# Patient Record
Sex: MALE | Race: WHITE | NOT HISPANIC OR LATINO | Employment: STUDENT | URBAN - METROPOLITAN AREA
[De-identification: names, ages, dates, MRNs, and addresses within clinical notes are randomized per-mention and may not be internally consistent; named-entity substitution may affect disease eponyms.]

---

## 2021-09-21 ENCOUNTER — APPOINTMENT (OUTPATIENT)
Dept: CT IMAGING | Facility: CLINIC | Age: 26
End: 2021-09-21
Attending: EMERGENCY MEDICINE
Payer: COMMERCIAL

## 2021-09-21 ENCOUNTER — APPOINTMENT (OUTPATIENT)
Dept: CARDIOLOGY | Facility: CLINIC | Age: 26
End: 2021-09-21
Attending: EMERGENCY MEDICINE
Payer: COMMERCIAL

## 2021-09-21 ENCOUNTER — HOSPITAL ENCOUNTER (EMERGENCY)
Facility: CLINIC | Age: 26
Discharge: HOME | End: 2021-09-21
Attending: EMERGENCY MEDICINE | Admitting: EMERGENCY MEDICINE
Payer: COMMERCIAL

## 2021-09-21 VITALS
HEIGHT: 71 IN | TEMPERATURE: 99.6 F | HEART RATE: 87 BPM | RESPIRATION RATE: 10 BRPM | OXYGEN SATURATION: 99 % | SYSTOLIC BLOOD PRESSURE: 129 MMHG | WEIGHT: 190 LBS | BODY MASS INDEX: 26.6 KG/M2 | DIASTOLIC BLOOD PRESSURE: 81 MMHG

## 2021-09-21 DIAGNOSIS — R50.9 FEVER AND CHILLS: ICD-10-CM

## 2021-09-21 DIAGNOSIS — J18.9 PNEUMONIA OF BOTH LUNGS DUE TO INFECTIOUS ORGANISM, UNSPECIFIED PART OF LUNG: ICD-10-CM

## 2021-09-21 LAB
ANION GAP SERPL CALCULATED.3IONS-SCNC: 6 MMOL/L (ref 3–14)
BASOPHILS # BLD MANUAL: 0 10E3/UL (ref 0–0.2)
BASOPHILS NFR BLD MANUAL: 0 %
BUN SERPL-MCNC: 6 MG/DL (ref 7–30)
CALCIUM SERPL-MCNC: 9 MG/DL (ref 8.5–10.1)
CHLORIDE BLD-SCNC: 105 MMOL/L (ref 94–109)
CO2 SERPL-SCNC: 24 MMOL/L (ref 20–32)
CREAT SERPL-MCNC: 1.08 MG/DL (ref 0.66–1.25)
EOSINOPHIL # BLD MANUAL: 0.1 10E3/UL (ref 0–0.7)
EOSINOPHIL NFR BLD MANUAL: 1 %
ERYTHROCYTE [DISTWIDTH] IN BLOOD BY AUTOMATED COUNT: 11.5 % (ref 10–15)
GFR SERPL CREATININE-BSD FRML MDRD: >90 ML/MIN/1.73M2
GLUCOSE BLD-MCNC: 86 MG/DL (ref 70–99)
HCT VFR BLD AUTO: 44.1 % (ref 40–53)
HGB BLD-MCNC: 15.6 G/DL (ref 13.3–17.7)
LVEF ECHO: NORMAL
LYMPHOCYTES # BLD MANUAL: 1.1 10E3/UL (ref 0.8–5.3)
LYMPHOCYTES NFR BLD MANUAL: 21 %
MCH RBC QN AUTO: 33.6 PG (ref 26.5–33)
MCHC RBC AUTO-ENTMCNC: 35.4 G/DL (ref 31.5–36.5)
MCV RBC AUTO: 95 FL (ref 78–100)
MONOCYTES # BLD MANUAL: 0.3 10E3/UL (ref 0–1.3)
MONOCYTES NFR BLD MANUAL: 6 %
NEUTROPHILS # BLD MANUAL: 3.7 10E3/UL (ref 1.6–8.3)
NEUTROPHILS NFR BLD MANUAL: 72 %
PLAT MORPH BLD: NORMAL
PLATELET # BLD AUTO: 138 10E3/UL (ref 150–450)
POTASSIUM BLD-SCNC: 4.3 MMOL/L (ref 3.4–5.3)
RBC # BLD AUTO: 4.64 10E6/UL (ref 4.4–5.9)
RBC MORPH BLD: NORMAL
SODIUM SERPL-SCNC: 135 MMOL/L (ref 133–144)
WBC # BLD AUTO: 5.2 10E3/UL (ref 4–11)

## 2021-09-21 PROCEDURE — 99285 EMERGENCY DEPT VISIT HI MDM: CPT | Mod: 25 | Performed by: EMERGENCY MEDICINE

## 2021-09-21 PROCEDURE — 258N000003 HC RX IP 258 OP 636: Performed by: EMERGENCY MEDICINE

## 2021-09-21 PROCEDURE — 87040 BLOOD CULTURE FOR BACTERIA: CPT | Performed by: EMERGENCY MEDICINE

## 2021-09-21 PROCEDURE — 96361 HYDRATE IV INFUSION ADD-ON: CPT | Performed by: EMERGENCY MEDICINE

## 2021-09-21 PROCEDURE — 96366 THER/PROPH/DIAG IV INF ADDON: CPT | Performed by: EMERGENCY MEDICINE

## 2021-09-21 PROCEDURE — 85027 COMPLETE CBC AUTOMATED: CPT | Performed by: EMERGENCY MEDICINE

## 2021-09-21 PROCEDURE — 87506 IADNA-DNA/RNA PROBE TQ 6-11: CPT | Performed by: EMERGENCY MEDICINE

## 2021-09-21 PROCEDURE — 93306 TTE W/DOPPLER COMPLETE: CPT

## 2021-09-21 PROCEDURE — 71275 CT ANGIOGRAPHY CHEST: CPT

## 2021-09-21 PROCEDURE — 99284 EMERGENCY DEPT VISIT MOD MDM: CPT | Performed by: EMERGENCY MEDICINE

## 2021-09-21 PROCEDURE — 36415 COLL VENOUS BLD VENIPUNCTURE: CPT | Performed by: EMERGENCY MEDICINE

## 2021-09-21 PROCEDURE — 80048 BASIC METABOLIC PNL TOTAL CA: CPT | Performed by: EMERGENCY MEDICINE

## 2021-09-21 PROCEDURE — 96365 THER/PROPH/DIAG IV INF INIT: CPT | Mod: 59 | Performed by: EMERGENCY MEDICINE

## 2021-09-21 PROCEDURE — 250N000011 HC RX IP 250 OP 636: Performed by: STUDENT IN AN ORGANIZED HEALTH CARE EDUCATION/TRAINING PROGRAM

## 2021-09-21 PROCEDURE — 250N000011 HC RX IP 250 OP 636: Performed by: EMERGENCY MEDICINE

## 2021-09-21 PROCEDURE — 93306 TTE W/DOPPLER COMPLETE: CPT | Mod: 26 | Performed by: STUDENT IN AN ORGANIZED HEALTH CARE EDUCATION/TRAINING PROGRAM

## 2021-09-21 PROCEDURE — 71275 CT ANGIOGRAPHY CHEST: CPT | Mod: 26 | Performed by: RADIOLOGY

## 2021-09-21 RX ORDER — IOPAMIDOL 755 MG/ML
65 INJECTION, SOLUTION INTRAVASCULAR ONCE
Status: COMPLETED | OUTPATIENT
Start: 2021-09-21 | End: 2021-09-21

## 2021-09-21 RX ORDER — SODIUM CHLORIDE 9 MG/ML
INJECTION, SOLUTION INTRAVENOUS CONTINUOUS
Status: DISCONTINUED | OUTPATIENT
Start: 2021-09-21 | End: 2021-09-21 | Stop reason: HOSPADM

## 2021-09-21 RX ORDER — LEVOFLOXACIN 5 MG/ML
750 INJECTION, SOLUTION INTRAVENOUS ONCE
Status: COMPLETED | OUTPATIENT
Start: 2021-09-21 | End: 2021-09-21

## 2021-09-21 RX ORDER — LEVOFLOXACIN 500 MG/1
500 TABLET, FILM COATED ORAL DAILY
Qty: 7 TABLET | Refills: 0 | Status: SHIPPED | OUTPATIENT
Start: 2021-09-21 | End: 2021-09-28

## 2021-09-21 RX ADMIN — SODIUM CHLORIDE 1000 ML: 9 INJECTION, SOLUTION INTRAVENOUS at 14:01

## 2021-09-21 RX ADMIN — LEVOFLOXACIN 750 MG: 5 INJECTION, SOLUTION INTRAVENOUS at 16:04

## 2021-09-21 RX ADMIN — IOPAMIDOL 65 ML: 755 INJECTION, SOLUTION INTRAVENOUS at 14:59

## 2021-09-21 ASSESSMENT — ENCOUNTER SYMPTOMS
BACK PAIN: 1
SORE THROAT: 0
COUGH: 0
DYSURIA: 0
FATIGUE: 0
COLOR CHANGE: 0
FEVER: 1
HEADACHES: 0
MYALGIAS: 0
DIARRHEA: 1
SHORTNESS OF BREATH: 0
HEMATURIA: 0
NAUSEA: 1
ABDOMINAL PAIN: 0
VOMITING: 0

## 2021-09-21 ASSESSMENT — MIFFLIN-ST. JEOR: SCORE: 1863.96

## 2021-09-21 NOTE — ED NOTES
Bed: ED10  Expected date:   Expected time:   Means of arrival:   Comments:  Healthy 24 yo male; persistent fever and chest pain; High d-dimer; Needs CT chest; 102F x4 days; negative Covid/negative flu.  Duane Myklejord; [birth year]     Annamaria Castaneda MD  09/21/21 9441

## 2021-09-21 NOTE — ED PROVIDER NOTES
"    [place] EMERGENCY DEPARTMENT ([facility])  9/21/21  History     Chief Complaint   Patient presents with     Chest Pain     Fever     103.7F @ home.     The history is provided by the patient and medical records.     Duane K Myklejord is an otherwise healthy 25 year old male who presents to the Emergency Department for evaluation of fever. Patient comes in via recommendation after being evaluated at [facility] earlier today.  Patient had an elevated D-dimer at this visit and was encouraged to come into the ED for evaluation.  Upon ED evaluation, patient reports she has had a persistently high fever since Saturday (9/18) with a T-max of 103.7 at home.  Patient also reports he has been having some slight chest pain, and back pain over the right shoulder which is intermittent in nature.  Patient denies any shortness of breath.  Patient also reports he has been having ongoing loose stools for the past 3-4 days which are \"liquid\" in description.  He denies any abdominal pain.  He reports he is slightly nauseous but has not had any vomiting.  Patient reports his appetite has been decreased but has been drinking plenty of fluids.  Patient reports he has been taking Tylenol and ibuprofen at home for his fever, and last took this at 11 PM last night.  Patient denies any fatigue and reports he generally feels well here in the ED.  He denies any myalgias, headache, cough, or sneezing.  No apparent skin rashes or redness.  He denies any new medical problems.  Patient denies any sore throat.  He also denies any dysuria or hematuria.  Patient reports he is currently vaccinated from COVID-19.  He denies any recent antibiotic use.  Of note, patient reports he does live in a house with other individuals who are currently not sick or have any similar symptoms.  Patient reports he did just come back from visiting his parents in [facility], otherwise no recent extended travel.    Past Medical History  No " "past medical history on file.  No past surgical history on file.  levofloxacin (LEVAQUIN) 500 MG tablet      No Known Allergies  Family History  No family history on file.  Social History   Social History     Tobacco Use     Smoking status: Not on file   Substance Use Topics     Alcohol use: Not on file     Drug use: Not on file      Past medical history, past surgical history, medications, allergies, family history, and social history were reviewed with the patient. No additional pertinent items.     I have reviewed the Medications, Allergies, Past Medical and Surgical History, and Social History in the Epic system.    Review of Systems   Constitutional: Positive for fever (TMax 103.7 via home thermometer). Negative for fatigue.   HENT: Negative for sneezing and sore throat.    Respiratory: Negative for cough and shortness of breath.    Cardiovascular: Positive for chest pain.   Gastrointestinal: Positive for diarrhea and nausea. Negative for abdominal pain and vomiting.   Genitourinary: Negative for dysuria and hematuria.   Musculoskeletal: Positive for back pain (Near L-shoulder blade). Negative for myalgias.   Skin: Negative for color change and rash.   Neurological: Negative for headaches.   All other systems reviewed and are negative.        Physical Exam   BP: 125/75  Pulse: 93  Temp: 99.9  F (37.7  C)  Resp: 16  Height: 180.3 cm (5' 11\")  Weight: 86.2 kg (190 lb)  SpO2: 99 %      Physical Exam  Vitals and nursing note reviewed.   Constitutional:       General: He is not in acute distress.     Appearance: He is not ill-appearing, toxic-appearing or diaphoretic.   HENT:      Head: Atraumatic.   Eyes:      General: No scleral icterus.     Pupils: Pupils are equal, round, and reactive to light.   Cardiovascular:      Rate and Rhythm: Normal rate and regular rhythm.      Pulses: Normal pulses.      Heart sounds: Normal heart sounds.   Pulmonary:      Effort: No respiratory distress.      Breath sounds: Normal " breath sounds. No stridor. No wheezing, rhonchi or rales.   Abdominal:      General: Bowel sounds are normal. There is no distension.      Palpations: Abdomen is soft. There is no mass.      Tenderness: There is no abdominal tenderness.      Hernia: No hernia is present.   Musculoskeletal:         General: No swelling or tenderness.      Cervical back: Normal range of motion. No rigidity or tenderness.      Right lower leg: No edema.      Left lower leg: No edema.   Skin:     General: Skin is warm.      Findings: No rash.   Neurological:      General: No focal deficit present.      Mental Status: He is oriented to person, place, and time.      Cranial Nerves: No cranial nerve deficit.      Sensory: No sensory deficit.   Psychiatric:         Mood and Affect: Mood normal.         Behavior: Behavior normal.         Thought Content: Thought content normal.         Judgment: Judgment normal.         ED Course     At 1:39 PM the patient was seen and examined by Annamaria Castaneda MD in Room ED10.        Procedures            Results for orders placed or performed during the hospital encounter of 09/21/21   CT Chest Pulmonary Embolism w Contrast     Status: None    Narrative    CT CHEST PULMONARY EMBOLISM WITH CONTRAST 9/21/2021 3:11 PM    CLINICAL HISTORY: Chest Pain. Pulmonary embolism suspected,  low/intermediate probability, negative D-dimer.  TECHNIQUE: CT angiogram chest during arterial phase injection IV  contrast. 2D and 3D MIP reconstructions were performed by the CT  technologist. Dose reduction techniques were used.     CONTRAST: Iopamidol (ISOVUE-370) solution 65 mL    COMPARISON: None.    FINDINGS:  ANGIOGRAM CHEST: Pulmonary arteries are normal caliber and negative  for pulmonary emboli. Thoracic aorta is negative for dissection. No CT  evidence of right heart strain.    LUNGS AND PLEURA: Patchy bilateral groundglass and consolidative  infiltrates. No effusions.    MEDIASTINUM/AXILLAE: Mildly prominent  lymph nodes noted in the chest,  nonspecific and likely reactive in this setting.    UPPER ABDOMEN: No acute findings in the visualized upper abdomen.    MUSCULOSKELETAL: No frankly destructive bony lesions.      Impression    IMPRESSION:  1.  Scattered groundglass and consolidative infiltrates, question  septic emboli vs. an unusual pattern of pneumonia.  2.  Mildly prominent lymph nodes in the chest which is nonspecific but  likely reactive in this setting.  3.  No pulmonary embolism demonstrated.    MELVIN THURMAN MD         SYSTEM ID:  [id number]   Basic metabolic panel     Status: Abnormal   Result Value Ref Range    Sodium 135 133 - 144 mmol/L    Potassium 4.3 3.4 - 5.3 mmol/L    Chloride 105 94 - 109 mmol/L    Carbon Dioxide (CO2) 24 20 - 32 mmol/L    Anion Gap 6 3 - 14 mmol/L    Urea Nitrogen 6 (L) 7 - 30 mg/dL    Creatinine 1.08 0.66 - 1.25 mg/dL    Calcium 9.0 8.5 - 10.1 mg/dL    Glucose 86 70 - 99 mg/dL    GFR Estimate >90 >60 mL/min/1.73m2   CBC with platelets and differential     Status: Abnormal   Result Value Ref Range    WBC Count 5.2 4.0 - 11.0 10e3/uL    RBC Count 4.64 4.40 - 5.90 10e6/uL    Hemoglobin 15.6 13.3 - 17.7 g/dL    Hematocrit 44.1 40.0 - 53.0 %    MCV 95 78 - 100 fL    MCH 33.6 (H) 26.5 - 33.0 pg    MCHC 35.4 31.5 - 36.5 g/dL    RDW 11.5 10.0 - 15.0 %    Platelet Count 138 (L) 150 - 450 10e3/uL   Manual Differential     Status: None   Result Value Ref Range    % Neutrophils 72 %    % Lymphocytes 21 %    % Monocytes 6 %    % Eosinophils 1 %    % Basophils 0 %    Absolute Neutrophils 3.7 1.6 - 8.3 10e3/uL    Absolute Lymphocytes 1.1 0.8 - 5.3 10e3/uL    Absolute Monocytes 0.3 0.0 - 1.3 10e3/uL    Absolute Eosinophils 0.1 0.0 - 0.7 10e3/uL    Absolute Basophils 0.0 0.0 - 0.2 10e3/uL    RBC Morphology Confirmed RBC Indices     Platelet Assessment  Automated Count Confirmed. Platelet morphology is normal.     Automated Count Confirmed. Platelet morphology is normal.   Echocardiogram Complete      Status: None   Result Value Ref Range    LVEF  60-65%     [facility]    [id number]  [id number]  [id number]  [id number]^LISSA^MORENITA^WHITNEY     [facility],[place]  Echocardiography Laboratory  [address] [place] [address]     Name: MYKLEJORD, DUANE K  MRN: [id number]  : 1995  Study Date: 2021 03:55 PM  Age: 25 yrs  Gender: Male  Patient Location: [facility]  Reason For Study: Endocarditis  Ordering Physician: MORENITA ALCARAZ  Performed By: Judson Jack     BSA: 2.1 m2  Height: 71 in  Weight: 190 lb  HR: 78  BP: 132/84 mmHg  ______________________________________________________________________________  Procedure  Complete Portable Echo Adult. Echocardiogram with two-dimensional, color and  spectral Doppler performed.  ______________________________________________________________________________  Interpretation Summary  No vegetation or mass identified, however this does not exclude endocarditis.  Global and regional left ventricular function is normal with an EF of 60-65%.  Right ventricular function, chamber size, wall motion, and thickness are  normal.  No pericardial effusion is present.  ______________________________________________________________________________  Left Ventricle  Global and regional left ventricular function is normal with an EF of 60-65%.  Left ventricular size is normal. Left ventricular diastolic function is  normal.     Right Ventricle  Right ventricular function, chamber size, wall motion, and thickness are  normal.     Atria  Both atria appear normal.     Mitral Valve  The mitral valve is normal. Trace mitral insufficiency is present.     Aortic Valve  Aortic valve is normal in structure and function. The aortic valve is  tricuspid.     Tricuspid Valve  The tricuspid valve is normal. Trace tricuspid insufficiency is present.  Pulmonary artery systolic pressure cannot be assessed.     Pulmonic Valve  The pulmonic valve is normal.      Vessels  The aorta root is normal. The thoracic aorta is normal. The pulmonary artery  cannot be assessed. The inferior vena cava was normal in size with preserved  respiratory variability.     Pericardium  No pericardial effusion is present.     Compared to Previous Study  Previous study not available for comparison.  ______________________________________________________________________________  MMode/2D Measurements & Calculations     IVSd: 0.92 cm  LVIDd: 5.0 cm  LVIDs: 3.3 cm  LVPWd: 1.0 cm  FS: 33.0 %  LV mass(C)d: 172.4 grams  LV mass(C)dI: 83.5 grams/m2  Ao root diam: 2.7 cm  asc Aorta Diam: 2.4 cm  LVOT diam: 2.1 cm  LVOT area: 3.5 cm2  LA Volume (BP): 53.6 ml  LA Volume Index (BP): 26.0 ml/m2  RWT: 0.41     Doppler Measurements & Calculations  MV E max viki: 67.9 cm/sec  MV A max viki: 51.4 cm/sec  MV E/A: 1.3  MV dec slope: 375.0 cm/sec2  E/E' av.7  Lateral E/e': 5.0  Medial E/e': 6.4     ______________________________________________________________________________  Report approved by: MD Jayden Del Valle 2021 04:28 PM         CBC with platelets differential     Status: Abnormal    Narrative    The following orders were created for panel order CBC with platelets differential.  Procedure                               Abnormality         Status                     ---------                               -----------         ------                     CBC with platelets and d...[[id number]]  Abnormal            Final result               Manual Differential[[id number]]                              Final result                 Please view results for these tests on the individual orders.     Medications   0.9% sodium chloride BOLUS (1,000 mLs Intravenous New Bag 21 1401)     Followed by   sodium chloride 0.9% infusion (has no administration in time range)   levofloxacin (LEVAQUIN) infusion 750 mg (750 mg Intravenous New Bag 21 1604)   iopamidol (ISOVUE-370) solution 65 mL  (65 mLs Intravenous Given 9/21/21 1459)   sodium chloride (PF) 0.9% PF flush 94 mL (94 mLs Intravenous Given 9/21/21 1459)          The medical record was reviewed and interpreted.  Current labs reviewed and interpreted.  Previous labs reviewed and interpreted.  Current images reviewed and interpreted: CT chest reveiwed .     No results found for this or any previous visit (from the past 24 hour(s)).  Medications   0.9% sodium chloride BOLUS (has no administration in time range)     Followed by   sodium chloride 0.9% infusion (has no administration in time range)             Assessments & Plan (with Medical Decision Making)  Patient is a 25-year-old male who was sent to the ER from [place] and due to fever.  Patient's been having a high-grade fever for the past 4 days.  Patient had blood work done at [place] that showed he had an elevated D-dimer.  He was therefore sent to the ER for evaluation.  Patient here was afebrile at 99 9.  He has a stable blood pressure.  He is satting at 100% on room air.  Patient's Covid test and rapid flu were both negative in clinic.  Patient here had a CT chest done that shows he has some scattered scattered groundglass opacities and some consolidative infiltrates the question about whether this is an atypical pneumonia or septic emboli.  I therefore discussed the case with the infectious disease doctor on-call.  I spoke to Dr. Brenda Cramer who discussed plan of care with the patient.  She says that she would recommend a TTE to evaluate for possible vegetation.  She says that if theTTE is normal she would discharge the patient with oral antibiotics with plan for him to return to the hospital if his symptoms get worse or to he has a fever for more than 2 to 3 days longer, or if his blood cultures come back positive.  Patient does have 2 blood cultures that are pending.  I did reevaluate the patient and patient is feeling well.  He agrees the plan of care.  Patient has no history of IV drug  use and has no history of heart disease.  Patient is having a bedside echocardiogram done at this time.      If the bedside echocardiogram is normal, plan will be to discharge him home with oral Levaquin.  Patient is getting a 1 dose of Levaquin here in the ER.  Patient is well-appearing with no signs of systemic problems and I feel comfortable with him being discharged home.  Patient does say that he will return to the ER if symptoms worsen or if blood cultures come back positive.  Patient also agrees to follow-up with [facility] later next week.  ---  This part of the medical record was transcribed by Oliver Canchola, Medical Scribe, from a dictation done by Annamaria Castaneda MD.     Patient with  negative echocardiogram.  Plan of care was discussed with the patient.  She agrees to being discharged home with close follow-up.  He agrees to return to the hospital if his symptoms worsen or his blood cultures come back positive.             I have reviewed the nursing notes.    I have reviewed the findings, diagnosis, plan and need for follow up with the patient.    New Prescriptions    No medications on file       Final diagnoses:   Fever and chills   Pneumonia of both lungs due to infectious organism, unspecified part of lung       I, Oliver Canchola am serving as a trained medical scribe to document services personally performed by Annamaria Castaneda MD, based on the provider's statements to me.      I, Annamaria Castaneda MD, was physically present and have reviewed and verified the accuracy of this note documented by Oliver Canchola.     Annamaria Castaneda MD  9/21/2021   [facility] EMERGENCY DEPARTMENT     Annamaria Castaneda MD  09/21/21 3168     03-Sep-2019 19:59

## 2021-09-21 NOTE — DISCHARGE INSTRUCTIONS
Your CT chest shows a pneumonia that appears to be causing your fever.     Please take the antibiotics as prescribed.    Please return to the ER if you feel worse or are unable to take your antibiotics.     If you are still having fevers after 3 days, please come back to the hospital.     You will receive a phone call if your blood cultures show signs of infection.    Your echocardiogram (ultrasound of your heart) was normal.     Please make an appointment to follow up with [facility] (phone: [phone]) in 5-8 days even if entirely better.

## 2021-09-21 NOTE — ED TRIAGE NOTES
25YR male patient - presenting to ED for eval of chest pain, fevers; concern for infection and // or PE.  Airway patent.

## 2021-09-22 NOTE — RESULT ENCOUNTER NOTE
Final Enteric Bacteria and Virus Panel by [facility] Stool is NEGATIVE for all tested organisms (bacteria/virus).  No treatment or change in treatment per [facility] Lab Result Enteric Bacteria and Virus Panel protocol.

## 2021-09-23 NOTE — ED NOTES
I called patient on September 23 around 10:00 in the morning.  Patient said that he was doing well and fevers at subsided.  He noted a lingering dry cough but otherwise says he was feeling better.  I informed him that his blood cultures thus far have been negative.  I also told him that his stool cultures thus far have been negative.  Patient told to follow-up with [facility] early next week and return to the ER if symptoms recur or worsen.    Signed:  Annamaria Castaneda MD  September 23, 2021 at 10:13 AM       Annamaria Castaneda MD  09/23/21 1013

## 2021-09-26 LAB
BACTERIA BLD CULT: NO GROWTH
BACTERIA BLD CULT: NO GROWTH